# Patient Record
Sex: MALE | Race: WHITE | Employment: STUDENT | URBAN - METROPOLITAN AREA
[De-identification: names, ages, dates, MRNs, and addresses within clinical notes are randomized per-mention and may not be internally consistent; named-entity substitution may affect disease eponyms.]

---
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
## 2024-02-27 ENCOUNTER — APPOINTMENT (OUTPATIENT)
Dept: GENERAL RADIOLOGY | Age: 19
End: 2024-02-27
Payer: COMMERCIAL

## 2024-02-27 ENCOUNTER — HOSPITAL ENCOUNTER (EMERGENCY)
Age: 19
Discharge: HOME | End: 2024-02-27
Attending: EMERGENCY MEDICINE
Payer: COMMERCIAL

## 2024-02-27 VITALS
HEART RATE: 61 BPM | BODY MASS INDEX: 24.88 KG/M2 | RESPIRATION RATE: 16 BRPM | WEIGHT: 168 LBS | SYSTOLIC BLOOD PRESSURE: 111 MMHG | TEMPERATURE: 100 F | HEIGHT: 69 IN | DIASTOLIC BLOOD PRESSURE: 62 MMHG | OXYGEN SATURATION: 96 %

## 2024-02-27 DIAGNOSIS — R11.2 NAUSEA AND VOMITING, UNSPECIFIED VOMITING TYPE: ICD-10-CM

## 2024-02-27 DIAGNOSIS — J11.1 INFLUENZA: Primary | ICD-10-CM

## 2024-02-27 LAB
ANION GAP SERPL CALC-SCNC: 7 MMOL/L (ref 0–18)
BUN BLD-MCNC: 14 MG/DL (ref 9–23)
CALCIUM BLD-MCNC: 8.5 MG/DL (ref 8.5–10.1)
CHLORIDE SERPL-SCNC: 102 MMOL/L (ref 98–112)
CO2 SERPL-SCNC: 26 MMOL/L (ref 21–32)
CREAT BLD-MCNC: 0.94 MG/DL
EGFRCR SERPLBLD CKD-EPI 2021: 119 ML/MIN/1.73M2 (ref 60–?)
GLUCOSE BLD-MCNC: 101 MG/DL (ref 70–99)
OSMOLALITY SERPL CALC.SUM OF ELEC: 281 MOSM/KG (ref 275–295)
POCT INFLUENZA A: NEGATIVE
POCT INFLUENZA B: POSITIVE
POTASSIUM SERPL-SCNC: 3.7 MMOL/L (ref 3.5–5.1)
SARS-COV-2 RNA RESP QL NAA+PROBE: NOT DETECTED
SODIUM SERPL-SCNC: 135 MMOL/L (ref 136–145)

## 2024-02-27 PROCEDURE — 87430 STREP A AG IA: CPT

## 2024-02-27 PROCEDURE — 96361 HYDRATE IV INFUSION ADD-ON: CPT

## 2024-02-27 PROCEDURE — 71045 X-RAY EXAM CHEST 1 VIEW: CPT | Performed by: EMERGENCY MEDICINE

## 2024-02-27 PROCEDURE — 99284 EMERGENCY DEPT VISIT MOD MDM: CPT

## 2024-02-27 PROCEDURE — 87502 INFLUENZA DNA AMP PROBE: CPT

## 2024-02-27 PROCEDURE — 80048 BASIC METABOLIC PNL TOTAL CA: CPT | Performed by: EMERGENCY MEDICINE

## 2024-02-27 PROCEDURE — 71045 X-RAY EXAM CHEST 1 VIEW: CPT

## 2024-02-27 PROCEDURE — 96374 THER/PROPH/DIAG INJ IV PUSH: CPT

## 2024-02-27 PROCEDURE — 87502 INFLUENZA DNA AMP PROBE: CPT | Performed by: EMERGENCY MEDICINE

## 2024-02-27 PROCEDURE — 87430 STREP A AG IA: CPT | Performed by: EMERGENCY MEDICINE

## 2024-02-27 RX ORDER — ONDANSETRON 4 MG/1
4 TABLET, ORALLY DISINTEGRATING ORAL EVERY 4 HOURS PRN
Qty: 10 TABLET | Refills: 0 | Status: SHIPPED | OUTPATIENT
Start: 2024-02-27 | End: 2024-03-05

## 2024-02-27 RX ORDER — ONDANSETRON 2 MG/ML
4 INJECTION INTRAMUSCULAR; INTRAVENOUS ONCE
Status: COMPLETED | OUTPATIENT
Start: 2024-02-27 | End: 2024-02-27

## 2024-02-27 RX ORDER — SODIUM CHLORIDE 9 MG/ML
INJECTION, SOLUTION INTRAVENOUS ONCE
Status: COMPLETED | OUTPATIENT
Start: 2024-02-27 | End: 2024-02-27

## 2024-02-27 RX ORDER — IBUPROFEN 600 MG/1
600 TABLET ORAL ONCE
Status: COMPLETED | OUTPATIENT
Start: 2024-02-27 | End: 2024-02-27

## 2024-02-27 NOTE — DISCHARGE INSTRUCTIONS
Rest at home  Encourage fluids at home  Return to the ER  if symptoms worsen or if any other problems arise  Mucinex for symptoms at home  Please alternate motrin and tylenol every 3 hours for symptoms at home

## 2024-02-27 NOTE — ED INITIAL ASSESSMENT (HPI)
Patient with c/o body aches and sore throat since Wednesday. N/V/D started over the weekend. Cough started Saturday, worse yesterday. Fever started yesterday. Tylenol last taken at 1600.

## 2024-02-27 NOTE — ED PROVIDER NOTES
Patient Seen in: [facility] Department In [place]      History     Chief Complaint   Patient presents with    Cough/URI     Stated Complaint: nausea, body aches, vomiting x 6 days    Subjective:   HPI    Patient is a 18-year-old male otherwise healthy presents emergency room with a history of cough and bodyaches and sore throat which has been ongoing since Wednesday, 6 days prior to arrival in the emergency room.  The patient has had some nausea vomiting over the weekend.  Patient had some dry heaves this morning as per patient's father giving independent history at the bedside.  Patient came from school today where he states that his roommate has recently been diagnosed with influenza.  The patient has a cough that has been ongoing since Saturday.  The patient denies history of any recent antibiotic use.  The patient denies history of any other somatic complaints or discomfort at this time    Objective:   Past Medical History:   Diagnosis Date    COVID-19 09/2020              Past Surgical History:   Procedure Laterality Date    OTHER      right elbow                Social History     Socioeconomic History    Marital status: Single   Tobacco Use    Smoking status: Former     Types: Cigarettes    Smokeless tobacco: Never   Vaping Use    Vaping Use: Never used   Substance and Sexual Activity    Alcohol use: Not Currently     Comment: occ    Drug use: Never               Review of Systems    Positive for stated complaint: nausea, body aches, vomiting x 6 days  Other systems are as noted in HPI.  Constitutional and vital signs reviewed.      All other systems reviewed and negative except as noted above.    Physical Exam     ED Triage Vitals   BP 02/27/24 0535 137/83   Pulse 02/27/24 0535 73   Resp 02/27/24 0542 18   Temp 02/27/24 0535 100 °F (37.8 °C)   Temp src 02/27/24 0535 Temporal   SpO2 02/27/24 0535 96 %   O2 Device 02/27/24 0535 None (Room air)       Current:/83   Pulse 73   Temp 100 °F (37.8 °C) (Temporal)   Resp 18   Ht 175.3 cm (5' 9\")   Wt 76.2 kg   SpO2 96%   BMI 24.81 kg/m²         Physical Exam    GENERAL: Well-developed, well-nourished male sitting up breathing easily in no apparent distress.  Patient is nontoxic in appearance.  HEENT: Head is normocephalic, atraumatic. Pupils are 4 mm equally round and reactive to light. Oropharynx is clear. Mucous membranes are somewhat dry  NECK:  No stridor.  LUNGS: Clear to auscultation bilaterally with no wheeze. There is good equal air entry bilaterally.  HEART: Regular rate and rhythm. Normal S1, S2 no S3, or S4. No murmur.  ABDOMEN: There is no focal tenderness to palpation appreciated anywhere throughout the abdomen. There is no guarding, no rebound, no mass, and no organomegaly appreciated. There is normoactive bowel sounds. NEURO: Patient is awake, alert and oriented to time place and person. Motor strength is 5 over 5 in all 4 extremities. There are no gross motor or sensory deficits appreciated.  Patient answering all questions appropriately.        ED Course     Labs Reviewed   BASIC METABOLIC PANEL (8) - Abnormal; Notable for the following components:       Result Value    Glucose 101 (*)     Sodium 135 (*)     All other components within normal limits   POCT FLU TEST - Abnormal; Notable for the following components:    POCT INFLUENZA B Positive (*)     All other components within normal limits     Narrative:     This assay is a rapid molecular in vitro test utilizing nucleic acid amplification of influenza A and B viral RNA.   RAPID STREP A SCREEN (LC) - Normal    Narrative:     A confirmatory culture is recommended if clinically indicated.   RAPID SARS-COV-2 BY PCR - Normal     I personally viewed the patient's chest x-ray my dividual interpretation shows no evidence of any acute infiltrate.  I also reviewed the official radiology report which showed results as noted below.        XR CHEST AP PORTABLE  (CPT=71045)    Result Date: 2/27/2024  CONCLUSION:  There is no evidence of active cardiopulmonary disease on this single portable chest radiograph.  LOCATION:  [facility]      Dictated by (CST): Ramsey Lindquist MD on 2/27/2024 at 6:57 AM     Finalized by (CST): Ramsey Lindquist MD on 2/27/2024 at 6:57 AM               MDM        7:08 patient sitting back and breathing easily in no apparent distress this time.  Will discharge to home at this time.        Patient had IV line established had electrolytes done that were unremarkable.  Patient given a liter of IV fluid and also given Zofran here in the ER with improvement.  Patient had no further new complaints throughout the rest of the emergency room stay.  Patient tested for COVID, flu, and RSV.  Differential diagnosis considered by myself includes but is not limited to acute COVID infection, acute influenza, acute dehydration.  Patient feeling better after fluids given here in the ER.  Patient is positive for influenza B.  Patient will need instructions to encourage fluids to alternate Motrin and Tylenol for symptoms at home take Mucinex for symptoms at home return to the ER immediately if symptoms worsen or if any other problems arise.  Patient discharged home at this  time.                                                                                                                                                                                                                                                                                                                                                                                                                                                                                                                                                                                                                                                                                                                                                                                                                                                                                                                                                                                                                                                                           Medical Decision Making      Disposition and Plan     Clinical Impression:  1. Influenza    2. Nausea and vomiting, unspecified vomiting type         Disposition:  Discharge  2/27/2024  7:10 am    Follow-up:  Rut Browne MD  [id number] [address]  [facility] [address]  [phone]    Follow up            Medications Prescribed:  Current Discharge Medication List        START taking these medications    Details   ondansetron 4 MG Oral Tablet Dispersible Take 1 tablet (4 mg total) by mouth every 4 (four) hours as needed for Nausea.  Qty: 10 tablet, Refills: 0
